# Patient Record
Sex: FEMALE | Race: WHITE
[De-identification: names, ages, dates, MRNs, and addresses within clinical notes are randomized per-mention and may not be internally consistent; named-entity substitution may affect disease eponyms.]

---

## 2018-02-13 NOTE — PDOC
History of Present Illness





- General


Chief Complaint: Pain


Stated Complaint: OVARIAN CYST


Time Seen by Provider: 02/13/18 13:39


History Source: Patient


Exam Limitations: No Limitations





- History of Present Illness


Travel History: No


Initial Comments: 





02/13/18 15:37


37-year-old female presents to the ED with complaints of worsening left 

suprapubic pain for the past 2 days causing her difficulty with ADLs. Patient 

states did not take anything for the pain and came to the ER since she has 

history of ovarian cyst and is worried about torsion. Patient states has been 

seen by multiple GYN specialist who recommended oral contraceptives which 

patient could not tolerate. Patient denies history of PCO as but does state 

history of hemorrhagic cyst without removal.Patient denies fever, chills, nausea

, irregular menses, dysuria, or vaginal discharge.


Timing/Duration: reports: getting worse


Quality: reports: moderate, cramping, sharpness


Abdominal Pain Onset Location: reports: suprapubic


Aggravating Factors: improves with: Movement


Alleviating Factors: improves with: None





Past History





- Travel


Traveled outside of the country in the last 30 days: No





- Past Medical History


Allergies/Adverse Reactions: 


 Allergies











Allergy/AdvReac Type Severity Reaction Status Date / Time


 


morphine Allergy   Verified 02/13/18 12:19


 


neomycin sulfate Allergy  Rash Verified 02/13/18 12:19





[From Neosporin     





([id number])]     


 


polymyxin B Allergy  Rash Verified 02/13/18 12:19





[From Neosporin     





([id number])]     











Home Medications: 


Ambulatory Orders





Fluoxetine HCl [Prozac -] 20 mg PO DAILY 01/10/14 


Sumatriptan Succinate [Imitrex] 100 mg PO PRN PRN 01/10/14 


Alprazolam [Xanax] 0.25 mg PO PRN 02/13/18 








COPD: No


 Disorders: Yes (ovarian cyst)


Psychiatric Problems: Yes (ANXIETY)


Other medical history: MIGRAINES





- Surgical History


Cholecystectomy: Yes





- Reproductive History


LMP Normal: Yes


Is Patient Pregnant Now?: No


Polycystic Ovaries: Yes





- Immunization History


Immunization Up to Date: Yes





- Suicide/Smoking/Psychosocial Hx


Smoking History: Never smoked


Have you smoked in the past 12 months: Yes


Number of Cigarettes Smoked Daily: 20


Information on smoking cessation initiated: Yes


'Breaking Loose' booklet given: 02/13/18


Hx Alcohol Use: No


Drug/Substance Use Hx: No


Substance Use Type: None


Patient Lives Alone: No


Lives with/in: parents





**Review of Systems





- Review of Systems


Able to Perform ROS?: No


Constitutional: No: Symptoms Reported


Respiratory: No: Symptoms reported


Cardiac (ROS): No: Symptoms Reported


ABD/GI: Yes: Abdominal cramping


: No: Symptoms Reported


Musculoskeletal: No: Symptoms Reported


Integumentary: No: Symptoms Reported


Neurological: No: Symptoms reported


Endocrine: No: Symptoms Reported


Hematologic/Lymphatic: No: Symptoms Reported





*Physical Exam





- Vital Signs


 Last Vital Signs











Temp Pulse Resp BP Pulse Ox


 


 97.3 F L  66   18   108/71   97 


 


 02/13/18 12:19  02/13/18 16:15  02/13/18 12:19  02/13/18 16:15  02/13/18 16:15














- Physical Exam


General Appearance: Yes: Nourished, Appropriately Dressed.  No: Apparent 

Distress


HEENT: negative: Pale Conjunctivae


Neck: positive: Normal Thyroid, Supple


Respiratory/Chest: positive: Lungs Clear, Normal Breath Sounds.  negative: 

Respiratory Distress, Accessory Muscle Use


Cardiovascular: positive: Regular Rhythm, Regular Rate.  negative: Murmur


Female Pelvic Exam: positive: cervical os closed, adnexal tenderness (left).  

negative: CMT, discharge, vaginal bleeding


Gastrointestinal/Abdominal: positive: Normal Bowel Sounds, Soft, Tenderness (

left suprapubic)


Integumentary: positive: Normal Color, Warm, Moist


Neurologic: positive: Motor Strength 5/5 (ambulatory)





ED Treatment Course





- ADDITIONAL ORDERS


Additional order review: 


 Laboratory  Results











  02/13/18





  14:20


 


Urine Color  Straw


 


Urine Appearance  Clear


 


Urine pH  7.0


 


Ur Specific Gravity  1.004


 


Urine Protein  Negative


 


Urine Glucose (UA)  Negative


 


Urine Ketones  Negative


 


Urine Blood  Negative


 


Urine Nitrite  Negative


 


Urine Bilirubin  Negative


 


Urine Urobilinogen  Negative


 


Ur Leukocyte Esterase  Negative


 


Urine HCG, Qual  Negative














- RADIOLOGY


Radiology Studies Ordered: 














 Category Date Time Status


 


 PELVIC / BLADDER US [US] Stat Ultrasound  02/13/18 15:20 Ordered


 


 TRANSVAGINAL ULTRASOUND US [US] Stat Ultrasound  02/13/18 15:20 Ordered














- Medications


Given in the ED: 


ED Medications














Discontinued Medications














Generic Name Dose Route Start Last Admin





  Trade Name Freq  PRN Reason Stop Dose Admin


 


Acetaminophen  650 mg  02/13/18 14:03  02/13/18 14:52





  Tylenol -  PO  02/13/18 14:04  650 mg





  ONCE ONE   Administration


 


Oxycodone/Acetaminophen  1 combo  02/13/18 14:03  02/13/18 14:52





  Percocet 5/325 -  PO  02/13/18 14:04  1 combo





  ONCE ONE   Administration














Medical Decision Making





- Medical Decision Making





02/13/18 15:46


Patient with left adnexal discomfort and has history of recurrent frequent 

hemorrhagic cyst. Patient on exam did have tenderness to the left adnexal 

region without discharge. Patient had a urinalysis urine pregnancy Tylenol, 1 

Percocet and ultrasound


02/13/18 16:47


 Laboratory Tests











  02/13/18





  14:20


 


Urine Ketones  Negative


 


Urine Nitrite  Negative


 


Ur Leukocyte Esterase  Negative


 


Urine HCG, Qual  Negative











02/13/18 17:10


Ultrasound shows mildly enlarged bilateral ovaries with no evidence of ovarian 

torsion at this time. Intermittent torsion-D torsion cannot be excluded 

radiologist. Requiring clinical correlation. Bilateral complex ovarian lesions 

as described above are presumably hemorrhagic cyst. A follow-up pelvic 

ultrasound was recommended in 6 weeks for reevaluation of these lesions.


Patient states feeling better after receiving the Percocet but after the 

ultrasound her pain is returning. Patient ordered for 1 more Percocet will be 

discharged home with the same along with follow-up with her GYN and copies of 

today's ultrasound.





*DC/Admit/Observation/Transfer


Diagnosis at time of Disposition: 


 Hemorrhagic cyst








- Discharge Dispostion


Disposition: HOME


Condition at time of disposition: Improved





- Referrals


Referrals: 


Annika Sanchez MD [Primary Care Provider] - 





- Patient Instructions


Printed Discharge Instructions:  DI for Hemorrhagic Cystitis


Additional Instructions: 


At this time I do recommend follow up with the GYN to discuss today's visit and 

bring copy of ultrasound report with you. You may take Motrin or Percocet as 

needed for discomfort.


May also apply heating pad to the affected area.











- Post Discharge Activity

## 2018-10-30 NOTE — PDOC
History of Present Illness





- General


Chief Complaint: Injury


Stated Complaint: FALL,INJURY HEAD/LT HAND


Time Seen by Provider: 10/30/18 09:54


History Source: Patient


Exam Limitations: No Limitations





- History of Present Illness


Initial Comments: 





10/30/18 13:57


37-year-old female presents to emergency room with complaints of headache, neck 

pain, and upper extremity pain. Patient states last night was going to lock the 

front door when she had tripped causing her to fall down a set of stairs. 

Patient states that a brief moment of LOC without nausea, visual changes, 

weakness. Patient currently on no anticoagulation therapy and denies chest pain

, shortness of breath or abdominal pain.  





Occurred: reports: yesterday


Severity: reports: moderate


Pain Location: reports: head, neck, upper extremity


Method of Injury: Yes: fall


Loss of Consciousness: brief (seconds)


Associated Symptoms (Fall): headache





Past History





- Travel


Traveled outside of the country in the last 30 days: No





- Past Medical History


Allergies/Adverse Reactions: 


 Allergies











Allergy/AdvReac Type Severity Reaction Status Date / Time


 


morphine Allergy   Verified 10/30/18 09:22


 


neomycin sulfate Allergy  Rash Verified 10/30/18 09:22





[From Neosporin     





([id number])]     


 


polymyxin B Allergy  Rash Verified 10/30/18 09:22





[From Neosporin     





([id number])]     











Home Medications: 


Ambulatory Orders





Fluoxetine HCl [Prozac -] 20 mg PO DAILY 01/10/14 


Sumatriptan Succinate [Imitrex] 100 mg PO PRN PRN 01/10/14 


Alprazolam [Xanax] 0.25 mg PO PRN 02/13/18 


Oxycodone HCl/Acetaminophen [Percocet 5-325 mg Tablet] 1 - 2 tab PO Q6H PRN #12 

tab MDD 4 02/13/18 








COPD: No


 Disorders: Yes (ovarian cyst)


Psychiatric Problems: Yes (ANXIETY)


Other medical history: migraines





- Surgical History


Cholecystectomy: Yes





- Reproductive History


Polycystic Ovaries: Yes





- Immunization History


Immunization Up to Date: Yes





- Suicide/Smoking/Psychosocial Hx


Smoking History: Current every day smoker


Have you smoked in the past 12 months: Yes


Number of Cigarettes Smoked Daily: 20


Information on smoking cessation initiated: No


'Breaking Loose' booklet given: 02/13/18


Hx Alcohol Use: No


Drug/Substance Use Hx: No


Substance Use Type: None


Patient Lives Alone: No


Lives with/in: spouse/SO





Trauma Specific PMHX





- Complaint Specific PMHX


Back Injury: No


Neck Injury: No





**Review of Systems





- Review of Systems


Able to Perform ROS?: Yes


Constitutional: No: Symptoms Reported


HEENTM: No: Symptoms Reported


Respiratory: No: Symptoms reported


Cardiac (ROS): No: Symptoms Reported


ABD/GI: No: Symptoms Reported


: No: Symptoms Reported


Musculoskeletal: Yes: Joint Pain (left wrist and right fingers), Neck Pain


Integumentary: Yes: Lumps (right occipital)


Neurological: Yes: Headache (generalized)


Endocrine: No: Symptoms Reported


Hematologic/Lymphatic: No: Symptoms Reported





*Physical Exam





- Vital Signs


 Last Vital Signs











Temp Pulse Resp BP Pulse Ox


 


 97.9 F   78   18   139/84   100 


 


 10/30/18 09:22  10/30/18 09:22  10/30/18 09:22  10/30/18 09:22  10/30/18 09:22














- Physical Exam


General Appearance: Yes: Nourished, Appropriately Dressed.  No: Apparent 

Distress


HEENT: positive: EOMI, TOSIN, TMs Normal, Pharynx Normal.  negative: Pale 

Conjunctivae


Neck: positive: Supple, Tender midline (C5-C6).  negative: Decreased range of 

motion


Respiratory/Chest: positive: Lungs Clear, Normal Breath Sounds.  negative: 

Chest Tender, Respiratory Distress, Accessory Muscle Use


Cardiovascular: positive: Regular Rhythm, Regular Rate.  negative: Murmur


Gastrointestinal/Abdominal: positive: Soft.  negative: Tenderness


Extremity: positive: Normal Capillary Refill, Normal Range of Motion, Tender (

left wrist and mid forearm).  negative: Normal Inspection (ecchymotic edematous 

right third and fourth fingers), Pedal Edema


Integumentary: positive: Normal Color, Warm, Swelling (rt occipital 3 cm 

hematoma)


Neurologic: positive: Normal Mood/Affect (groggy ( on psych meds)), Motor 

Strength 5/5 (ambulatory)





ED Treatment Course





- ADDITIONAL ORDERS


Additional order review: 


 Laboratory  Results











  10/30/18





  10:25


 


Urine Color  Ltyellow


 


Urine Appearance  Clear


 


Urine pH  8.0


 


Ur Specific Gravity  1.005 L


 


Urine Protein  Negative


 


Urine Glucose (UA)  Negative


 


Urine Ketones  Negative


 


Urine Blood  1+ H


 


Urine Nitrite  Negative


 


Urine Bilirubin  Negative


 


Urine Urobilinogen  Negative


 


Ur Leukocyte Esterase  Negative


 


Urine WBC (Auto)  <1


 


Urine RBC (Auto)  1


 


Ur Epithelial Cells  Rare


 


Urine Bacteria  Moderate


 


Urine HCG, Qual  Negative














- RADIOLOGY


Radiology Studies Ordered: 














 Category Date Time Status


 


 CERVICAL SPINE CT W/O CONTR [CT] Stat CT Scan  10/30/18 11:29 Ordered


 


 HEAD CT WITHOUT CONTRAST [CT] Stat CT Scan  10/30/18 11:29 Ordered


 


 FINGER(S) RIGHT [RAD] Stat Radiology  10/30/18 11:29 Ordered


 


 FOREARM- LEFT [RAD] Stat Radiology  10/30/18 11:29 Ordered


 


 WRIST-LEFT [RAD] Stat Radiology  10/30/18 11:29 Ordered














- Medications


Given in the ED: 


ED Medications














Discontinued Medications














Generic Name Dose Route Start Last Admin





  Trade Name Freq  PRN Reason Stop Dose Admin


 


Acetaminophen  650 mg  10/30/18 11:31  10/30/18 11:39





  Tylenol -  PO  10/30/18 11:32  650 mg





  ONCE ONE   Administration





     





     





     





     


 


Oxycodone/Acetaminophen  1 combo  10/30/18 11:30  10/30/18 11:39





  Percocet 5/325 -  PO  10/30/18 11:31  1 combo





  ONCE ONE   Administration





     





     





     





     














Medical Decision Making





- Medical Decision Making





10/30/18 13:53


Status post fall downstairs complaining of pain to the head, neck right and 

left arm. Patient otherwise stable requesting Percocet for pain. Patient 

ordered for CT and x-rays of the upper extremities.





10/30/18 14:53


Patient with normal x-rays with no acute findings. Head and cervical CT 

negative for acute findings. Patient be discharged home with recommendations to 

take Motrin or Tylenol for pain and apply ice to the affected areas.





*DC/Admit/Observation/Transfer


Diagnosis at time of Disposition: 


 Contusion, Fall








- Discharge Dispostion


Disposition: HOME


Condition at time of disposition: Improved





- Referrals


Referrals: 


Annika Sanchez MD [Primary Care Provider] - 





- Patient Instructions


Printed Discharge Instructions:  DI for Contusion


Additional Instructions: 


At this time I recommend applying ice to the affected areas as much as you can 

tolerate for the next 72 hours and may take Motrin or Tylenol for pain.








- Post Discharge Activity

## 2020-08-07 ENCOUNTER — HOSPITAL ENCOUNTER (EMERGENCY)
Dept: HOSPITAL 74 - JER | Age: 40
Discharge: HOME | End: 2020-08-07
Payer: COMMERCIAL

## 2020-08-07 VITALS — HEART RATE: 67 BPM | TEMPERATURE: 98.1 F | DIASTOLIC BLOOD PRESSURE: 65 MMHG | SYSTOLIC BLOOD PRESSURE: 120 MMHG

## 2020-08-07 VITALS — BODY MASS INDEX: 23.6 KG/M2

## 2020-08-07 DIAGNOSIS — R00.2: Primary | ICD-10-CM

## 2020-08-07 LAB
ALBUMIN SERPL-MCNC: 3.8 G/DL (ref 3.4–5)
ALP SERPL-CCNC: 42 U/L (ref 45–117)
ALT SERPL-CCNC: 19 U/L (ref 13–61)
ANION GAP SERPL CALC-SCNC: 4 MMOL/L (ref 8–16)
APPEARANCE UR: CLEAR
AST SERPL-CCNC: 12 U/L (ref 15–37)
BASOPHILS # BLD: 0.9 % (ref 0–2)
BILIRUB SERPL-MCNC: 0.5 MG/DL (ref 0.2–1)
BILIRUB UR STRIP.AUTO-MCNC: NEGATIVE MG/DL
BUN SERPL-MCNC: 6.1 MG/DL (ref 7–18)
CALCIUM SERPL-MCNC: 9 MG/DL (ref 8.5–10.1)
CHLORIDE SERPL-SCNC: 108 MMOL/L (ref 98–107)
CO2 SERPL-SCNC: 29 MMOL/L (ref 21–32)
COLOR UR: YELLOW
CREAT SERPL-MCNC: 0.6 MG/DL (ref 0.55–1.3)
DEPRECATED RDW RBC AUTO: 13.4 % (ref 11.6–15.6)
EOSINOPHIL # BLD: 3.1 % (ref 0–4.5)
GLUCOSE SERPL-MCNC: 78 MG/DL (ref 74–106)
HCT VFR BLD CALC: 40.7 % (ref 32.4–45.2)
HGB BLD-MCNC: 13.6 GM/DL (ref 10.7–15.3)
KETONES UR QL STRIP: NEGATIVE
LEUKOCYTE ESTERASE UR QL STRIP.AUTO: NEGATIVE
LYMPHOCYTES # BLD: 35.6 % (ref 8–40)
MAGNESIUM SERPL-MCNC: 2.3 MG/DL (ref 1.8–2.4)
MCH RBC QN AUTO: 31.1 PG (ref 25.7–33.7)
MCHC RBC AUTO-ENTMCNC: 33.5 G/DL (ref 32–36)
MCV RBC: 92.7 FL (ref 80–96)
MONOCYTES # BLD AUTO: 5.8 % (ref 3.8–10.2)
NEUTROPHILS # BLD: 54.6 % (ref 42.8–82.8)
NITRITE UR QL STRIP: NEGATIVE
PH UR: >= 9 [PH] (ref 5–8)
PLATELET # BLD AUTO: 246 K/MM3 (ref 134–434)
PMV BLD: 8.4 FL (ref 7.5–11.1)
POTASSIUM SERPLBLD-SCNC: 3.9 MMOL/L (ref 3.5–5.1)
PROT SERPL-MCNC: 6.7 G/DL (ref 6.4–8.2)
PROT UR QL STRIP: NEGATIVE
PROT UR QL STRIP: NEGATIVE
RBC # BLD AUTO: 4.39 M/MM3 (ref 3.6–5.2)
SODIUM SERPL-SCNC: 141 MMOL/L (ref 136–145)
SP GR UR: 1.01 (ref 1.01–1.03)
UROBILINOGEN UR STRIP-MCNC: 0.2 MG/DL (ref 0.2–1)
WBC # BLD AUTO: 7.6 K/MM3 (ref 4–10)

## 2020-08-07 PROCEDURE — 3E033NZ INTRODUCTION OF ANALGESICS, HYPNOTICS, SEDATIVES INTO PERIPHERAL VEIN, PERCUTANEOUS APPROACH: ICD-10-PCS

## 2020-08-07 NOTE — PDOC
History of Present Illness





- General


Chief Complaint: Irregular Heart Beat


Stated Complaint: PALPITATIOS


Time Seen by Provider: 08/07/20 11:11





- History of Present Illness


Initial Comments: 





Pt is a 38yo F with PMH anxiety, migraine headaches who presents with 

palpitations. Last night, had a 10-15min episode of chest pain (dull, non 

radiating, pleuritic, a/w diaphoresis, located at left midclavicular chest), b/l

hand and feet paresthesias, lightheadedness, R eye blurry vision, and "low BP 

and elevated temperature". States that symptoms self-resolved without any 

medication; states that she felt improved after drinking water. This morning at 

8am, she felt palpitations and lightheadedness and went to urgent care where she

was told her EKG was normal, but that the provider heard "skipped beats". 

Afterwards, at about 10am, she reports onset of chest tightness that was 

pleuritic, 3/10, non radiating, a/w tunnel vision, lightheadedness, and b/l hand

paresthesias. States in the past month has started taking effexor and high dose 

Vitamin D. She currently only reports palpitations. Denies FHx of MI, reports 

FHx thyroid problems.





PCP: Daniel


PMH: see above


PSHx: septoplasty, cholecystectomy


Meds: Zofran, Effexor, Xanax, Vit D, Rizatriptan


All: morphine, NSAIDs


Social: drinks 5-7cups of coffee/day





Review of Systems


CONSTITUTIONAL:reports subjective fever, chills, diaphoresis, generalized 

weakness, malaise


HEENT:reports chronic rhinorrhea, nasal congestion, sore throat; reports blurry

vision 


CARDIOVASCULAR:reports chest pain, palpitations, lightheadedness; denies 

peripheral edema


RESPIRATORY:reports cough, shortness of breath; denies wheezing, hemoptysis


GASTROINTESTINAL: reports chronic nausea; denies abdominal pain, vomiting, 

diarrhea, constipation, melena, hematochezia


GENITOURINARY:denies dysuria, frequency, urgency, hematuria, flank pain


MUSCULOSKELETAL:reports chronic neck pain


HEMATOLOGIC/IMMUNOLOGIC:reports easy bruising, denies easy bleeding


ENDOCRINE: denies unexplained weight gain, unexplained weight loss


NEUROLOGIC:reports headache, focal weakness or paresthesias, denies unsteady 

gait, seizure, mental status changes, bladder or bowel incontinence


SKIN:denies rash, itching, pallor


PSYCHIATRIC:reports anxiety





Physical Exam


General: awake, alert, fully oriented, in no acute distress, well developed, 

well nourished


Head: normocephalic, atraumatic


Eyes: PERRL, EOMI, anicteric sclera, conjunctiva clear


ENT: hearing grossly normal, nares patent, oropharynx clear without exudates. No

nasal congestion Moist mucous membranes


Neck: supple, normal ROM, no LAD, JVD or masses


Lung: equal breath sounds b/l, CTA b/l, no crackles, wheezes; no distress, 

speaks full sentences


Heart: irregular rate, normal S1, S2, no murmurs, rubs, gallops


Abdomen: soft, non tender, normoactive bowel sounds, no guarding, rebound, 

masses


Extremities: normal ROM, no edema, no erythema or tenderness, DP/PT pulses 2+ 

and symmetric, no clubbing, cyanosis


Neuro: CN2-12 grossly intact, moves all extremities, normal speech, normal gait,

sensation intact, -pronator drift


Skin: warm, dry, no rashes or lesions noted





08/07/20 13:32








Past History





- Medical History


Allergies/Adverse Reactions: 


                                    Allergies











Allergy/AdvReac Type Severity Reaction Status Date / Time


 


morphine Allergy   Verified 10/30/18 09:22


 


neomycin sulfate Allergy  Rash Verified 10/30/18 09:22





[From Neosporin     





([id number])]     


 


polymyxin B Allergy  Rash Verified 10/30/18 09:22





[From Neosporin     





([id number])]     











Home Medications: 


Ambulatory Orders





Fluoxetine HCl [Prozac -] 20 mg PO DAILY 01/10/14 


Sumatriptan Succinate [Imitrex] 100 mg PO PRN PRN 01/10/14 


Alprazolam [Xanax] 0.25 mg PO PRN 02/13/18 


Oxycodone HCl/Acetaminophen [Percocet 5-325 mg Tablet] 1 - 2 tab PO Q6H PRN #12 

tab MDD 4 02/13/18 








COPD: No


 Disorders: Yes (ovarian cyst)


Psychiatric Problems: Yes (ANXIETY)





- Surgical History


Cholecystectomy: Yes





- Reproductive History


Is Patient Pregnant Now?: No


Polycystic Ovaries: Yes





- Immunization History


Immunization Up to Date: Yes





- Psycho-Social/Smoking History


Smoking History: Current every day smoker


Have you smoked in the past 12 months: Yes


Number of Cigarettes Smoked Daily: 20


Information on smoking cessation initiated: Yes


'Breaking Loose' booklet given: 02/13/18





- Substance Abuse Hx (Audit-C & DAST Scrn)


How often the patient has a drink containing alcohol: Never


Score: In Men: 4 or > Positive; In Women: 3 or > Positive: 0


Screen Result (Pos requires Nsg. Audit-10AR): Negative


In the last yr the pt used illegal drug/Rx for NonMed reason: No


Score:  Yes response is considered Positive: 0


Screen Result (Positive result requires Nsg. DAST-10): Negative





*Physical Exam





- Vital Signs


                                Last Vital Signs











Temp Pulse Resp BP Pulse Ox


 


 98.8 F   98 H  20   114/68   98 


 


 08/07/20 10:59  08/07/20 10:59  08/07/20 10:59  08/07/20 10:59  08/07/20 10:59














**Heart Score/ECG Review





- History


History: Slightly suspicious





- Electrocardiogram


EKG: Normal





- Age


Age: </= 45





- Risk Factors


Based on the list above the patient has:: No risk factors known





ED Treatment Course





- LABORATORY


CBC & Chemistry Diagram: 


                                 08/07/20 12:00





                                 08/07/20 12:00





Medical Decision Making





- Medical Decision Making





Pt is 38yo F with PMH anxiety, migraine headaches who presents with 

palpitations, chest pain, and lightheadedness x2 days.


Vital Signs











Temp Pulse Resp BP Pulse Ox


 


 98.8 F   98 H  20   114/68   98 


 


 08/07/20 10:59  08/07/20 10:59  08/07/20 10:59  08/07/20 10:59  08/07/20 10:59








DDx: medication adverse effect/interaction, arrhythmia, thyroid dysfunction, 

ACS, PE 


Plan: labs, EKG, CXR, pain control, IV fluids





PERC - ruled out PE (<49 yo, HR <100, sat >95%, no leg swelling, no hemoptysis, 

no recent trauma/surgery, no history of PE/DVT, no hormone use)


EKG: HR 62bpm, sinus rhythm with sinus arrhythmia with occasional PVCs, VA 

144ms, QRS 80ms, QTc 401ms





08/07/20 13:29


Labs: no leukocytosis, no anemia





08/07/20 14:06


Labs: electrolytes WNL, no ANGEL LUIS, Mg WNL, TSH WNL, troponin WNL





                                Laboratory Tests











  08/07/20 08/07/20 08/07/20





  12:00 12:00 12:00


 


WBC  7.6  


 


RBC  4.39  


 


Hgb  13.6  


 


Hct  40.7  


 


MCV  92.7  


 


MCH  31.1  


 


MCHC  33.5  


 


RDW  13.4  


 


Plt Count  246  


 


MPV  8.4  


 


Absolute Neuts (auto)  4.2  


 


Neutrophils %  54.6  


 


Lymphocytes %  35.6  


 


Monocytes %  5.8  


 


Eosinophils %  3.1  


 


Basophils %  0.9  


 


Nucleated RBC %  0  


 


Sodium   141 


 


Potassium   3.9 


 


Chloride   108 H 


 


Carbon Dioxide   29 


 


Anion Gap   4 L 


 


BUN   6.1 L 


 


Creatinine   0.6 


 


Est GFR (CKD-EPI)AfAm   132.14 


 


Est GFR (CKD-EPI)NonAf   114.01 


 


Random Glucose   78 


 


Calcium   9.0 


 


Magnesium   2.3 


 


Total Bilirubin   0.5 


 


AST   12 L 


 


ALT   19 


 


Alkaline Phosphatase   42 L 


 


Creatine Kinase   143 


 


Troponin I   < 0.02 


 


Total Protein   6.7 


 


Albumin   3.8 


 


TSH   1.37 


 


Serum Pregnancy, Qual    Negative














08/07/20 15:54


Pt states that she feels better; denies palpitations, headache, lightheadedness


CXR no acute pathology





Patient stable for discharge. Symptoms controlled. Informed of all lab and 

imaging results.


Given follow up instructions and strict return precautions. Told to follow up 

with prescribing physician regarding Effexor usage. 


Given referral to Cardiology. 


Patient expressed understanding and agree to plan





Disposition


Discharge to home








Discharge





- Discharge Information


Problems reviewed: Yes


Clinical Impression/Diagnosis: 


 Palpitations





Condition: Stable


Disposition: HOME





- Admission


No





- Follow up/Referral


Referrals: 


Annika Sanchez MD [Primary Care Provider] - 


Elvin Miller MD [Staff Physician] - 





- Patient Discharge Instructions


Patient Printed Discharge Instructions:  DI for Palpitations


Additional Instructions: 


You came into the ER palpitations, chest pain, lightheadedness. In the ED, you 

were evaluated with blood work, EKG, chest xray, urinalysis. Your bloodwork 

(blood counts, electrolytes, Magnesium, Thyroid) were normal. Your chest xray 

was normal. Your urinalysis results were normal (no infection). You do not 

appear to be an acute need for immediate hospitalization.





You were advised to follow up with your primary care doctor within 1 week.


You were given a referral to a cardiologist. Call today to schedule an 

appointment. Bring the copy of the EKG to your cardiology appointment.





Come back to the ER immediately with any new or worsening concerns. 





Thank you for coming to the [facility] ER. We hope you feel better soon!





- Post Discharge Activity

## 2020-08-07 NOTE — PDOC
History of Present Illness





- General


Chief Complaint: Irregular Heart Beat


Stated Complaint: PALPITATIOS


Time Seen by Provider: 08/07/20 11:11


History Source: Patient


Exam Limitations: No Limitations





Past History





- Medical History


Allergies/Adverse Reactions: 


                                    Allergies











Allergy/AdvReac Type Severity Reaction Status Date / Time


 


morphine Allergy   Verified 10/30/18 09:22


 


neomycin sulfate Allergy  Rash Verified 10/30/18 09:22





[From Neosporin     





([id number])]     


 


polymyxin B Allergy  Rash Verified 10/30/18 09:22





[From Neosporin     





([id number])]     











Home Medications: 


Ambulatory Orders





Fluoxetine HCl [Prozac -] 20 mg PO DAILY 01/10/14 


Sumatriptan Succinate [Imitrex] 100 mg PO PRN PRN 01/10/14 


Alprazolam [Xanax] 0.25 mg PO PRN 02/13/18 


Oxycodone HCl/Acetaminophen [Percocet 5-325 mg Tablet] 1 - 2 tab PO Q6H PRN #12 

tab MDD 4 02/13/18 








COPD: No


 Disorders: Yes (ovarian cyst)


Psychiatric Problems: Yes (ANXIETY)





- Surgical History


Cholecystectomy: Yes





- Reproductive History


Is Patient Pregnant Now?: No


Polycystic Ovaries: Yes





- Immunization History


Immunization Up to Date: Yes





- Psycho-Social/Smoking History


Smoking History: Current every day smoker


Have you smoked in the past 12 months: Yes


Number of Cigarettes Smoked Daily: 20


Information on smoking cessation initiated: Yes


'Breaking Loose' booklet given: 02/13/18





- Substance Abuse Hx (Audit-C & DAST Scrn)


How often the patient has a drink containing alcohol: Never


Score: In Men: 4 or > Positive; In Women: 3 or > Positive: 0


Screen Result (Pos requires Nsg. Audit-10AR): Negative


In the last yr the pt used illegal drug/Rx for NonMed reason: No


Score:  Yes response is considered Positive: 0


Screen Result (Positive result requires Nsg. DAST-10): Negative





*Physical Exam





- Vital Signs


                                Last Vital Signs











Temp Pulse Resp BP Pulse Ox


 


 98.8 F   98 H  20   114/68   98 


 


 08/07/20 10:59  08/07/20 10:59  08/07/20 10:59  08/07/20 10:59  08/07/20 10:59














Discharge





- Follow up/Referral


Referrals: 


Annika Sanchez MD [Primary Care Provider] - 





- Patient Discharge Instructions





- Post Discharge Activity

## 2020-08-07 NOTE — PDOC
Attending Attestation





- Resident


Resident Name: Nicky Ellison





- ED Attending Attestation


I have performed the following: I have examined & evaluated the patient, The 

case was reviewed & discussed with the resident, I agree w/resident's findings &

plan, Exceptions are as noted





- HPI


HPI: 





08/07/20 13:03


39y F hx of anxiety, migraine headache presents with complaint of intermittent 

palpitations since yesterday.  Patient states that she was at rest watching TV 

yesterday felt a brief episode of palpitations, lightheadedness, chest pressure,

tingling in the extremities lasting approximately 15 to 20 minutes before 

resolving. Today she woke up again with palpitations however did not have any 

sensation of chest pressure, She went to urgent care care to get evaluated she 

was noted to have an irregular heartbeat and sent to the ER for evaluation.  

Patient denies any further chest pain she does endorse occasionally feeling 

palpitations and feeling lightheaded when she looks down although she denies any

room spinning dizziness or sensation of movement, focal numbness, tingling, 

weakness, vision changes, Fever, chills, chest pain, dyspnea on exertion, 

hemoptysis, leg swelling.  Patient states that she has been feeling malaise for 

a long time and is currently following up with her primary care doctor and an 

endocrinologist for further work-up. 





social: chronic smoker (pack / day since 16), denies recreational drug use, etoh

abuse. 























- Physicial Exam


PE: 





08/07/20 13:42


GENERAL: The patient is awake, alert, and fully oriented, Nontoxic - in no acute

distress.


HEAD: Normocephalic, atraumatic.


EYES: extraocular movements intact, sclera anicteric, conjunctiva clear.


ENT: Normal voice,  Moist mucous membranes.


NECK: Normal range of motion, supple


LUNGS: Breath sounds equal, clear to auscultation bilaterally.  No wheezes, no 

rhonchi, no rales.


HEART: Regular rate and rhythm, normal S1 and S2 without murmur, rub or gallop.


ABDOMEN: Soft, nontender, No guarding, no rebound.  No CVA tenderness


EXTREMITIES: Normal range of motion, no edema.  


NEUROLOGICAL: No facial assymetry, Normal speech, movinga ll 4 extyremiteis 

spontaneouslyt and symmetrically, sensation intact throughout


PSYCH: Normal mood, normal affect.


SKIN: Warm, Dry, normal turgor,














- Medical Decision Making





08/07/20 13:46


39y F presents with intermittent palpitations and a resolved episode of chest 

pressure last night.  





currently feeling malaise, and occaionally lightheaded. 





will ck labs to ro anemia, metabolid derangement


will refer to cardiology if w/o negative











08/07/20 16:07


pts labs reviewed


will dc with cards fu


suspect her sx may be related to pvcs, perhaps with a touch of anxiety


return precutions wre discussed








**Heart Score/ECG Review





- ECG Impressions


Comment:: 





08/07/20 13:45


Twelve-lead EKG was performed and reviewed by me. 


There is normal sinus rhythm with a normal rate. 


rate of 62


Occasional PVC present


The axis is normal. 


The intervals are normal. 


There is normal R wave progression


There are no ST or T wave abnormalities.





Impression: Normal EKG with occasional PVC








Discharge





- Discharge Information


Problems reviewed: Yes


Clinical Impression/Diagnosis: 


 Palpitations





Condition: Stable


Disposition: HOME





- Follow up/Referral


Referrals: 


Elvin Miller MD [Staff Physician] - 


Annika Sanchez MD [Primary Care Provider] - 





- Patient Discharge Instructions


Patient Printed Discharge Instructions:  DI for Palpitations


Additional Instructions: 


You came into the ER palpitations, chest pain, lightheadedness. In the ED, you 

were evaluated with blood work, EKG, chest xray, urinalysis. Your bloodwork 

(blood counts, electrolytes, Magnesium, Thyroid) were normal. Your chest xray 

was normal. Your urinalysis results were normal (no infection). You do not 

appear to be an acute need for immediate hospitalization.





You were advised to follow up with your primary care doctor within 1 week.


You were given a referral to a cardiologist. Call today to schedule an 

appointment. Bring the copy of the EKG to your cardiology appointment.





Come back to the ER immediately with any new or worsening concerns. 





Thank you for coming to the [facility] ER. We hope you feel better soon!





- Post Discharge Activity

## 2020-08-08 NOTE — EKG
Test Reason : 

Blood Pressure : ***/*** mmHG

Vent. Rate : 062 BPM     Atrial Rate : 062 BPM

   P-R Int : 144 ms          QRS Dur : 080 ms

    QT Int : 396 ms       P-R-T Axes : 037 076 075 degrees

   QTc Int : 401 ms

 

SINUS RHYTHM WITH SINUS ARRHYTHMIA WITH OCCASIONAL PREMATURE

VENTRICULAR COMPLEXES

OTHERWISE NORMAL ECG

NO PREVIOUS ECGS AVAILABLE

Confirmed by Shannon Amaro ([id number]) on 8/8/2020 1:24:08 PM

 

Referred By:             Confirmed By:Shannon Amaro